# Patient Record
(demographics unavailable — no encounter records)

---

## 2025-05-12 NOTE — CONSULT LETTER
[Dear  ___] : Dear  [unfilled], [Courtesy Letter:] : I had the pleasure of seeing your patient, [unfilled], in my office today. [Please see my note below.] : Please see my note below. [Consult Closing:] : Thank you very much for allowing me to participate in the care of this patient.  If you have any questions, please do not hesitate to contact me. [Sincerely,] : Sincerely, [FreeTextEntry3] : Serina Romero MD\par  Attending Physician\par  Pediatric Gastroenterology and Nutrition

## 2025-05-12 NOTE — PHYSICAL EXAM
[Well Developed] : well developed [Well Nourished] : well nourished [NAD] : in no acute distress [PERRL] : pupils were equal, round, reactive to light  [Moist & Pink Mucous Membranes] : moist and pink mucous membranes [Normal Oropharynx] : the oropharynx was normal [CTAB] : lungs clear to auscultation bilaterally [Regular Rate and Rhythm] : regular rate and rhythm [Normal S1, S2] : normal S1 and S2 [Soft] : soft  [Normal Bowel Sounds] : normal bowel sounds [No HSM] : no hepatosplenomegaly appreciated [Normal Tone] : normal tone [Well-Perfused] : well-perfused [Interactive] : interactive [Appropriate Behavior] : appropriate behavior [icteric] : anicteric [Respiratory Distress] : no respiratory distress  [Wheeze] : no wheezing  [Murmur] : no murmur [Distended] : non distended [Tender] : non tender [Stool Palpable] : no stool palpable [Mass ___ cm] : no masses were palpated [Lymphadenopathy] : no lymphadenopathy  [Edema] : no edema [Cyanosis] : no cyanosis [Rash] : no rash [Jaundice] : no jaundice [de-identified] : Talkative

## 2025-05-12 NOTE — PAST MEDICAL HISTORY
[At Term] : at term [Normal Vaginal Route] : by normal vaginal route [None] : there were no delivery complications [] : There were problems passing meconium within 24 - 48 hrs of life [Age Appropriate] : age appropriate developmental milestones met

## 2025-05-12 NOTE — ASSESSMENT
[Educated Patient & Family about Diagnosis] : educated the patient and family about the diagnosis [FreeTextEntry1] : Polly is a 7 year old female  with  history of Celiac disease and constipation here for follow up. On strict gluten free diet since 03/2020.  She is currently off laxatives and stooling overall well.  She is eating a bigger variety of food and has found that she is able to have gluten-free foods at school lunch which is a big breakthrough for her.  She is growing well.  PLAN:  - increase fiber and fluids in diet  -Continue gluten-free diet -Labs to do in early summer - Family instructed to call to discuss lab results and if labs are normal would follow-up in a year

## 2025-05-12 NOTE — HISTORY OF PRESENT ILLNESS
[de-identified] : Polly is a 7 year old female child with history of celiac disease and constipation with stool withholding here for follow-up.she  Pertinent Medical History: Patient initially presented to clinic (08/2019) with complaint of constipation. Started on Miralax and responded well but unable to wean off Miralax.  Screening blood work 01/2020 which showed elevated celiac serologies. EGD performed which was consistent with Celiac disease. Started on gluten free diet 03/2020.  She is here today for follow-up visit.  She is doing very well.  They found the school does have some gluten-free options for her at lunchtime and she has a special gluten-free basket gluten-free lunch available. She is happier and is growing well. She had a the flu a few weeks ago and was eating less, but is fine now.  She is feeling well. No abd pain. She is stooling at nighttime. Stools are daily. Pioneer 4, occ 3.  Sometimes hard to pass. No rectal pain with stooling. She increased her milk intake. Added some juice. Still a picky eater but improving, will eat steak, strawberries. No benefiber. She is on Culturelle and Flonase. Iron in MV  she has been eating a lot more, she ebbs and flows, rotissere chicken then mozz stiack, pizza bagels. She  has been trying a lot of new things. She has had some hair loss. She has had abd pain. CSE, 504 on  She has been stooling 1x per day, bristol 4 and 5. .blood. No trouble following food.  No gluten exposure.